# Patient Record
Sex: MALE | Race: WHITE | Employment: STUDENT | URBAN - METROPOLITAN AREA
[De-identification: names, ages, dates, MRNs, and addresses within clinical notes are randomized per-mention and may not be internally consistent; named-entity substitution may affect disease eponyms.]

---

## 2019-02-13 ENCOUNTER — HOSPITAL ENCOUNTER (EMERGENCY)
Facility: CLINIC | Age: 24
Discharge: HOME | End: 2019-02-13
Attending: EMERGENCY MEDICINE | Admitting: EMERGENCY MEDICINE
Payer: COMMERCIAL

## 2019-02-13 VITALS
SYSTOLIC BLOOD PRESSURE: 130 MMHG | DIASTOLIC BLOOD PRESSURE: 84 MMHG | HEART RATE: 63 BPM | RESPIRATION RATE: 18 BRPM | OXYGEN SATURATION: 100 % | WEIGHT: 155 LBS | TEMPERATURE: 98.2 F

## 2019-02-13 DIAGNOSIS — K08.89 ODONTALGIA: ICD-10-CM

## 2019-02-13 DIAGNOSIS — K02.9 DENTAL DECAY: ICD-10-CM

## 2019-02-13 PROCEDURE — 25000132 ZZH RX MED GY IP 250 OP 250 PS 637: Performed by: EMERGENCY MEDICINE

## 2019-02-13 PROCEDURE — 99283 EMERGENCY DEPT VISIT LOW MDM: CPT | Mod: 25

## 2019-02-13 PROCEDURE — 64400 NJX AA&/STRD TRIGEMINAL NRV: CPT

## 2019-02-13 RX ORDER — IBUPROFEN 800 MG/1
800 TABLET, FILM COATED ORAL EVERY 8 HOURS PRN
Qty: 24 TABLET | Refills: 0 | Status: SHIPPED | OUTPATIENT
Start: 2019-02-13 | End: 2019-02-20

## 2019-02-13 RX ORDER — ACETAMINOPHEN 500 MG
500-1000 TABLET ORAL EVERY 8 HOURS PRN
Qty: 1 TABLET | Refills: 0 | Status: SHIPPED | OUTPATIENT
Start: 2019-02-13 | End: 2019-02-23

## 2019-02-13 RX ORDER — IBUPROFEN 800 MG/1
800 TABLET, FILM COATED ORAL ONCE
Status: COMPLETED | OUTPATIENT
Start: 2019-02-13 | End: 2019-02-13

## 2019-02-13 RX ORDER — ACETAMINOPHEN 500 MG
1000 TABLET ORAL ONCE
Status: COMPLETED | OUTPATIENT
Start: 2019-02-13 | End: 2019-02-13

## 2019-02-13 RX ORDER — BUPIVACAINE HYDROCHLORIDE AND EPINEPHRINE 5; 5 MG/ML; UG/ML
INJECTION, SOLUTION PERINEURAL
Status: DISCONTINUED
Start: 2019-02-13 | End: 2019-02-13 | Stop reason: HOSPADM

## 2019-02-13 RX ADMIN — IBUPROFEN 800 MG: 800 TABLET, FILM COATED ORAL at 07:14

## 2019-02-13 RX ADMIN — ACETAMINOPHEN 1000 MG: 500 TABLET, FILM COATED ORAL at 07:14

## 2019-02-13 SDOH — HEALTH STABILITY: MENTAL HEALTH: HOW OFTEN DO YOU HAVE A DRINK CONTAINING ALCOHOL?: NEVER

## 2019-02-13 ASSESSMENT — ENCOUNTER SYMPTOMS
FACIAL SWELLING: 0
FEVER: 0
TROUBLE SWALLOWING: 0

## 2019-02-13 NOTE — DISCHARGE INSTRUCTIONS
Discharge Instructions  Dental Pain    You have been seen today for a toothache. Your pain may be caused by an exposed nerve, an infection (pulpitis), a root abscess (pocket of pus), or other problems. You will need to see a dentist for a solution to your tooth problem. Emergency Department care is only to help control your problem until you can see a dentist; we cannot provide complete dental care.  Today, we did not find any sign that your toothache was caused by any dangerous or life-threatening condition, but sometimes symptoms develop over time and cannot be found during an emergency visit, so it is very important that you follow up with your dentist.      Generally, every Emergency Department visit should have a follow-up clinic visit with either a primary or a specialty clinic/provider. Please follow-up as instructed by your emergency provider today.    Return to the Emergency Department if:  You develop a new fever over 100.4 F.  You cannot open your mouth normally, cannot move your tongue well, or cannot swallow.  You have new or increased swelling of your face or neck.  You develop drainage of pus or foul smelling material from around your tooth.  What can I do to help myself?  Take any antibiotic the provider may have prescribed for you today.  Avoid very hot or very cold foods as both can cause pain.  Make an appointment to see a dentist as soon as possible. Dentists are generally not ?on-staff? at hospitals so we cannot ?refer? to you to dentist but we may be able to provide a list of dental clinics to help you.  If you were given a prescription for medicine here today, be sure to read all of the information (including the package insert) that comes with your prescription.  This will include important information about the medicine, its side effects, and any warnings that you need to know about.  The pharmacist who fills the prescription can provide more information and answer questions you may have  about the medicine.  If you have questions or concerns that the pharmacist cannot address, please call or return to the Emergency Department.   Remember that you can always come back to the Emergency Department if you are not able to see your regular provider in the amount of time listed above, if you get any new symptoms, or if there is anything that worries you.      Discharge Instructions  Dental Pain    You have been seen today for a toothache. Your pain may be caused by an exposed nerve, an infection (pulpitis), a root abscess, or other problems. You will need to see a dentist for a solution to your tooth problem. Emergency Department care is only to help control your problem until you can see a dentist.  Today, we did not find any sign that your toothache was caused by a serious condition, but sometimes symptoms develop over time and cannot be found during an emergency visit, so it is very important that you follow up with your dentist.      Return to the Emergency Department if:  You develop a fever over 101 degrees Fahrenheit  You can?t open your mouth normally, can?t move your tongue well, or can?t swallow  You have new or increased swelling of your face or neck.  You develop drainage of pus or foul smelling material from around your tooth.  What can I do to help myself?  Take any antibiotic the doctor may have prescribed for you today.  Avoid very hot or very cold foods as both can cause pain.  Make an appointment to see a dentist as soon as possible. If you wish, we can provide you with a list of low-cost dental clinics.       Remember that you can always come back to the Emergency Department if you are not able to see your regular doctor in the amount of time listed above, if you get any new symptoms, or if there is anything that worries you.        Dental Resources  Name/Address/Phone Eligibility Hours Fee   [facility]  [address], [address]  [place], [place] [address] [phone]  Anyone Call for appointment [facility]  Medical Assistance  Private Insurance   [facility]  [address] [place] [address]  [phone] Anyone Call for appointment    Jordin refers to low-cost dental clinics for non-preventive care    Zimbabwean Interpreters available Prices start at   Adults        Cleaning $36-$160        X-Ray $20-40  Children        Cleaning $15        X-ray $10-20        Fluoride $10  Accepts cash, check or credit;  Does not take insurance or MA.   [facility]  [address] [place], [place]  [phone] Anyone Afternoons and evenings    September-May    Answers phones after 10 AM $30.00 per visit   ($15.00 per visit if 62 or older)   Preventive care.  Restoration care; sliding fee; MA   [facility]  [address] [place] [address]  [phone] Children birth to age 18 and pregnant women    [facility] Residents without insurance will be asked to apply for Assured Care. M TH F 8:30 am - 5 pm  T W 8:30 am - 7 pm    30 locations metro wide    Call for appointment and to confirm hours. Sliding Fee  [facility]  Medical Assistance  Assured Access  Private Insurance    8 Languages Spoken   [facility] [address] [address]  [phone] Anyone Call for appointment Sliding Fee  Accept insurance, [place],   [place] and self-pay.  Call if no insurance.    All services provided.  Staff fluent in Hmong, Laotian, Icelandic, Slovak, Icelandic, Zimbabwean, and Farsi.   [facility]  [address] [place] [address]  [phone] Children  Adults in a walk in basis Mon - Fri. 8 - 5 pm       (closed 1-2 pm)  General Dentists & Hygienists  Private Dentists  Dentures Fees based on family size and income ranging from 40% - 100% payment by patient.   [facility]  [address] [place]  [address]    [phone]  Anyone Mon - Fri 7:30 am - 5:00 pm  By Appt.    Tues & Wed @ 3:00 call for urgent care Appt for next day service Sliding fee:  MA; Insurance   [facility]  [address] [place]  [address]  [phone] Anyone Call for an appointment.  Days open vary every semester. Adult cleaning $25  Child cleaning $15  X-Rays $10-$15  Whitening services available  $75, includes cleaning  Seniors 50% off   [facility]  [address] [place], [place]  [phone] Anyone M-F 8 am - 5 pm Most insurances accepted.  MA and Sliding Scale.   [facility]  [address] [place] [address]  [phone] Anyone without insurance Call to make appt   M-F 9:00 am - 5 pm   (Closed noon hour 12-1)    6 pm- 8 pm Evening hours also available for care Sliding fee based on income and size of household.   [facility]  [address] [place] [address]  [address]  [phone] press option 1    For the [facility] press option 4 Anyone              Anyone Monday  4 - 6:30 pm  Tuesday 12:30 - 3 & 4-6:30  Thursday 8:30 - 11 am & 12-2:30 pm  September through May only    All year around on Thursdays from 5-9 pm (only time a dentist is in.) Cleanings & X-Rays Only  Cleanings:  Adults $30                   Kids $20  X-Rays:  Adults $34                Kids $10    MA and Sliding fee   [facility]  [address] [place] [address]    [phone] Anyone    ([employer] interpreters available) M-F 8:00 am - 5:00 pm       By appointment only  (same day appointments available) Sliding fee ($40+ may be due at appointment, remainder billed); MA; Insurance   [facility]  [address] [place], [place] [phone]   Anyone    ([employer] interpreters available) M-Th 8:00 am - 8:00 pm  F 8:00 am - 5:00 pm    By appointment only  (same day appointments available)  Sliding fee ($40+ may be due at appointment, remainder billed); MA; Insurance   [facility]  [address] [place], [place]  [phone] Anyone    Must live within [facility] to qualify for sliding fee services Mon, Tues, Thurs, Fri  8:30 am - 5:00 pm  Wed. 8:30 am - 7:00 pm  All other services by appt. only Sliding Fee; MA   Offer payment plans    Have financial workers that will assist with [place] and will use sliding fee for those who do not qualify.      [facility]  [address] [place] [phone] Anyone without insurance     Hours and day of week vary  (Call ahead for hours)    Walk-in only Free Services    Cleanings; Fillings; Extractions   [facility]  [address] [place] [address]  [phone] Anyone Call for an appointment Accept patients with [place] and Medical Assistance.  10% discount if bill is paid in full on day of service.  No sliding fee scale.     [facility]  [address] [place], [place] [phone] Anyone M-F  8 am-5 pm  Call for appt.    Walk-in hours 8 am - 11am and 1 pm - 5 pm, however take scheduled appointments first    No emergency services or oral surgeries Sliding Fee available with an [place] or [place] denial letter and proof of income.    Accepts Assured Access card and MA coverage.     Name/Address/Phone Eligibility Hours Fee   [facility]  [address] [place]  [address] [phone] Anyone with emergencies only M & W clinic begins at 6 pm   Call ahead    Alternate Fridays for children by Appt only Free   [facility]  [address] [place] [address] [phone]    Emergencies (adults only):  [phone] Anyone Free walk-in screens for oral surgery    Call ahead for hours    All other services by appt. only  Accepts MA for pediatrics only    Rates are about 25% -  40% less than private dental office.    No sliding fee scale   [facility]  [facility] [place]  [address]  [phone] Anyone as long as they do not have health insurance Hours on Mondays, Tuesdays, and Thursdays Sliding fees based on monthly income    No root canals, tooth pulls or emergencies   [facility]  [address] [address]  [phone] Anyone  M - F 8:00 am - 5:00 pm  Wed 8:00 am - 8 pm Sliding fees; MA; Insurance   [facility]  [address] [place] [address]  [address]  [phone] Anyone age 55+ M - F 8:00 am - 4:30 pm    Appt. only Set slightly lower fees;  MA; Insurance         Give Kids a smile day in [facility] Children Takes place in February at a few locations                          Dental Pain:      Dear Emergency Department Patient:     Here at [facility], we are always pleased to evaluate you for emergency conditions and offer a screening examination. Today, we have seen you and determined that you have dental pain and/or a dental problem.  We do not have the equipment and/or advanced training to perform definitive dental care, therefore, you need to be seen by a dentist for further care.     You may see your regular dent  ist if you have one, or we have attached a list of community dental providers, including some who provide care at a reduced fee.      Please be aware that if a narcotic medication was prescribed, it will not be refilled in the emergency department.  Accordingly, if you should have ongoing problems and/or pain, you should contact a dentist right away for definitive treatment.    Sincerely,        [facility])

## 2019-02-13 NOTE — ED AVS SNAPSHOT
[facility] Emergency Department  [address]  [facility] [address]  Phone:  [phone]  Fax:  [phone]                                    Edgar Huynh   MRN: [id number]    Department:  [facility] Emergency Department   Date of Visit:  2/13/2019           After Visit Summary Signature Page    I have received my discharge instructions, and my questions have been answered. I have discussed any challenges I see with this plan with the nurse or doctor.    ..........................................................................................................................................  Patient/Patient Representative Signature      ..........................................................................................................................................  Patient Representative Print Name and Relationship to Patient    ..................................................               ................................................  Date                                   Time    ..........................................................................................................................................  Reviewed by Signature/Title    ...................................................              ..............................................  Date                                               Time          22EPIC Rev 08/18

## 2019-02-13 NOTE — ED PROVIDER NOTES
History     Chief Complaint:  Dental Pain    HPI   Edgar Huynh is a 23 year old male who presents with right lower jaw/dental pain. The patient states that he has had issues with his teeth in the past and has several known broken/cracked teeth. Yesterday, around 11 AM he was at work on break when he began experiencing a pain in his right lower jaw. He tried Ora-gel and 1 gram of Tylenol while at work with temporary relief. Since then he has developed a throbbing pain in his right lower jaw, radiating up into his right ear and right scalp. He has been unable to sleep much due to pain and comes here this morning for evaluation and hopeful pain control. The patient has not had any fevers or facial swelling. He denies any drainage from the area and has no sore throat.    Allergies:  The patient is currently on no regular medications.     Medications:    The patient is currently on no regular medications.      Past Medical History:    The patient does not have any past pertinent medical history.     Past Surgical History:    History reviewed. No pertinent surgical history.     Family History:    History reviewed. No pertinent family history.      Social History:  Smoking Status: Current Smoker  Alcohol Use: No  Patient presents alone  Marital Status:  Single      Review of Systems   Constitutional: Negative for fever.   HENT: Positive for dental problem and ear pain. Negative for facial swelling and trouble swallowing.    All other systems reviewed and are negative.      Physical Exam     Patient Vitals for the past 24 hrs:   BP Temp Temp src Pulse Resp SpO2 Weight   02/13/19 0636 130/84 98.2  F (36.8  C) Temporal 63 18 100 % 70.3 kg (155 lb)        Physical Exam  Nursing note and vitals reviewed.    Constitutional: Pleasant and well groomed.          HENT: Right lower first molar has severe decay. Floor of the mouth is soft. No facial swelling, no adjacent swelling.   Eyes: Conjunctivae normal are normal. No  scleral icterus.   Neck: No cervical adenopathy  Cardiovascular: Peripheral pulse with normal rate, regular rhythm. Intact distal pulses.Regular rate and rhythm to auscultation.  No murmur  Pulmonary/Chest: Effort normal    Neurological:Alert. Coordination normal.   Skin: Skin is warm and dry.   Psychiatric: Normal mood and affect.     Emergency Department Course     Procedures:  Procedure Note:     Periapical Dental Block    Benzocaine Gel was applied adjacent to the affected tooth. A dental syringe with 27 gauge need and 1.8 cc of 0.5% Bupivacaine with epinephrine was utilized.  A inferior alveolar nerve block was performed. he patient tolerated the procedure without complication. He noted improvement in his symptoms.     Interventions:  0714 - Ibuprofen 800 mg PO  0714 - Tylenol 1g PO     Emergency Department Course:  Past medical records, nursing notes, and vitals reviewed.  0704: I performed an exam of the patient and obtained history, as documented above.     Dental block performed as documented above.    0730: I rechecked the patient. Findings and plan explained to the Patient. Patient discharged home with instructions regarding supportive care, medications, and reasons to return. The importance of close follow-up was reviewed.       Impression & Plan      Medical Decision Making:  The patient presents with a tooth ache.  There is no swelling or fluctuance to suggest an abscess that would benefit from  incision and drainage.   The differential diagnosis includes, but is not limited to; cracked tooth syndrome, pulpitis, periapical abscess. There is no evidence of buccinator/canine space infections, significant facial swelling, or signs of Joshua's angina. I have instructed the patient to return to the ED with fever, facial swelling, other new or worse symptoms.  I have stressed the importance of  follow up with a dentist/endodontist as soon as possible for definitive management of their dental problem.  The  patient was provided with community dental resources.     Diagnosis:    ICD-10-CM    1. Odontalgia K08.89    2. Dental decay K02.9        Discharge Medications:     acetaminophen 500 MG tablet  Commonly known as:  TYLENOL  500-1,000 mg, Oral, EVERY 8 HOURS PRN, DO NOT FILL!  For Dosing Only       George Hernandez  2/13/2019   [facility] EMERGENCY DEPARTMENT  I, George Hernandez, am serving as a scribe at 7:04 AM on 2/13/2019 to document services personally performed by Shamika Hancock MD based on my observations and the provider's statements to me.       Shamika Hancock MD  02/13/19 1129

## 2019-02-13 NOTE — ED TRIAGE NOTES
23-year-old male presents to the ER with complaints of dental pain. States a tooth on the bottom right broke.

## 2019-03-29 ENCOUNTER — OFFICE VISIT - HEALTHEAST (OUTPATIENT)
Dept: FAMILY MEDICINE | Facility: CLINIC | Age: 24
End: 2019-03-29

## 2019-03-29 DIAGNOSIS — S39.012A STRAIN OF LUMBAR REGION, INITIAL ENCOUNTER: ICD-10-CM

## 2019-03-29 ASSESSMENT — MIFFLIN-ST. JEOR: SCORE: 1754.32

## 2019-04-08 ENCOUNTER — OFFICE VISIT - HEALTHEAST (OUTPATIENT)
Dept: FAMILY MEDICINE | Facility: CLINIC | Age: 24
End: 2019-04-08

## 2019-04-08 DIAGNOSIS — S39.012D STRAIN OF LUMBAR REGION, SUBSEQUENT ENCOUNTER: ICD-10-CM

## 2019-08-07 ENCOUNTER — APPOINTMENT (OUTPATIENT)
Dept: GENERAL RADIOLOGY | Facility: CLINIC | Age: 24
End: 2019-08-07
Attending: EMERGENCY MEDICINE
Payer: COMMERCIAL

## 2019-08-07 PROCEDURE — 99284 EMERGENCY DEPT VISIT MOD MDM: CPT | Mod: Z6 | Performed by: EMERGENCY MEDICINE

## 2019-08-07 PROCEDURE — 99284 EMERGENCY DEPT VISIT MOD MDM: CPT | Performed by: EMERGENCY MEDICINE

## 2019-08-07 PROCEDURE — 71046 X-RAY EXAM CHEST 2 VIEWS: CPT

## 2019-08-07 PROCEDURE — 73030 X-RAY EXAM OF SHOULDER: CPT | Mod: LT

## 2019-08-07 ASSESSMENT — MIFFLIN-ST. JEOR: SCORE: 1702.07

## 2019-08-08 ENCOUNTER — HOSPITAL ENCOUNTER (EMERGENCY)
Facility: CLINIC | Age: 24
Discharge: HOME | End: 2019-08-08
Attending: EMERGENCY MEDICINE | Admitting: EMERGENCY MEDICINE
Payer: COMMERCIAL

## 2019-08-08 VITALS
HEIGHT: 71 IN | DIASTOLIC BLOOD PRESSURE: 43 MMHG | TEMPERATURE: 98.7 F | SYSTOLIC BLOOD PRESSURE: 105 MMHG | RESPIRATION RATE: 16 BRPM | WEIGHT: 149.9 LBS | HEART RATE: 71 BPM | OXYGEN SATURATION: 97 % | BODY MASS INDEX: 20.98 KG/M2

## 2019-08-08 DIAGNOSIS — S20.212A CONTUSION OF LEFT CHEST WALL, INITIAL ENCOUNTER: ICD-10-CM

## 2019-08-08 PROCEDURE — 25000132 ZZH RX MED GY IP 250 OP 250 PS 637: Performed by: EMERGENCY MEDICINE

## 2019-08-08 RX ORDER — HYDROCODONE BITARTRATE AND ACETAMINOPHEN 5; 325 MG/1; MG/1
1 TABLET ORAL ONCE
Status: COMPLETED | OUTPATIENT
Start: 2019-08-08 | End: 2019-08-08

## 2019-08-08 RX ADMIN — HYDROCODONE BITARTRATE AND ACETAMINOPHEN 1 TABLET: 5; 325 TABLET ORAL at 01:10

## 2019-08-08 ASSESSMENT — ENCOUNTER SYMPTOMS
NECK PAIN: 1
NUMBNESS: 0
SHORTNESS OF BREATH: 0
NAUSEA: 0
MYALGIAS: 1
VOMITING: 0
HEADACHES: 0
WEAKNESS: 0
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
## 2019-08-08 NOTE — DISCHARGE INSTRUCTIONS
Please make an appointment to [facility] with [facility] (phone: [phone]) as needed.      Use ice packs as needed on area of discomfort.  You may use acetaminophen 1000 mg every 6 hours or ibuprofen 600 mg every 6 hours as needed for pain.      If you have any worsening symptoms, return to the emergency department for re-evaluation.
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
## 2019-08-08 NOTE — ED AVS SNAPSHOT
[facility], [place], Emergency Department  [address] [place] [id number]  Phone:  [phone]                                    Edgar Huynh   MRN: [id number]    Department:  [facility], Emergency Department   Date of Visit:  8/7/2019           After Visit Summary Signature Page    I have received my discharge instructions, and my questions have been answered. I have discussed any challenges I see with this plan with the nurse or doctor.    ..........................................................................................................................................  Patient/Patient Representative Signature      ..........................................................................................................................................  Patient Representative Print Name and Relationship to Patient    ..................................................               ................................................  Date                                   Time    ..........................................................................................................................................  Reviewed by Signature/Title    ...................................................              ..............................................  Date                                               Time          22EPIC Rev 08/18

## 2019-11-12 ENCOUNTER — OFFICE VISIT (OUTPATIENT)
Dept: URGENT CARE | Facility: URGENT CARE | Age: 24
End: 2019-11-12
Payer: COMMERCIAL

## 2019-11-12 VITALS
DIASTOLIC BLOOD PRESSURE: 80 MMHG | SYSTOLIC BLOOD PRESSURE: 112 MMHG | OXYGEN SATURATION: 97 % | TEMPERATURE: 98 F | HEART RATE: 96 BPM

## 2019-11-12 DIAGNOSIS — J06.9 VIRAL URI WITH COUGH: ICD-10-CM

## 2019-11-12 DIAGNOSIS — H66.91 RIGHT ACUTE OTITIS MEDIA: Primary | ICD-10-CM

## 2019-11-12 PROCEDURE — 99203 OFFICE O/P NEW LOW 30 MIN: CPT | Performed by: FAMILY MEDICINE

## 2019-11-12 RX ORDER — AMOXICILLIN 875 MG
875 TABLET ORAL 2 TIMES DAILY
Qty: 20 TABLET | Refills: 0 | Status: SHIPPED | OUTPATIENT
Start: 2019-11-12 | End: 2019-11-22

## 2019-11-12 RX ORDER — BENZONATATE 100 MG/1
100 CAPSULE ORAL 3 TIMES DAILY PRN
Qty: 21 CAPSULE | Refills: 1 | Status: SHIPPED | OUTPATIENT
Start: 2019-11-12

## 2019-11-13 NOTE — PROGRESS NOTES
Subjective:   Edgar Huynh is a 23 year old male who presents for   Chief Complaint   Patient presents with     Urgent Care     URI     Having cough, colds, chills, myalgia, fatigue, sore throat. Sx x2.5 weeks.     2.5 weeks of symptoms there was no period of feeling better - says the cough has been ongoing  Sleep has been not the best, waking up with sweats and coughing fits. Having some back discomfort from the coughing. No recorded fevers. Episode of vomiting 2 nights ago after a coughing fit. Appetite is better now. No hx of sinus infections that are recurrent. Some ear discomfort and slight pressure around his nose  Meds attempted: night time cold      There are no active problems to display for this patient.      Current Outpatient Medications   Medication     amoxicillin (AMOXIL) 875 MG tablet     benzonatate (TESSALON) 100 MG capsule     oxyCODONE-acetaminophen (PERCOCET) 5-325 MG per tablet     No current facility-administered medications for this visit.      ROS:  As above per HPI    Objective:   /80 (BP Location: Right arm, Patient Position: Chair, Cuff Size: Adult Regular)   Pulse 96   Temp 98  F (36.7  C) (Tympanic)   SpO2 97% , There is no height or weight on file to calculate BMI.  Gen:  NAD, well-nourished, sitting in chair comfortably  HEENT: EOMI, sclera anicteric, Head normocephalic, ; nares patent; moist mucous membranes,normal left TM, congested right TM with opaque fluid and bulging, no nasal polyps, normal tonsils without enlargement  Neck: trachea midline, no thyromegaly  CV:  Hemodynamically stable, RRR  Pulm:  no increased work of breathing , CTAB, no wheezes/rales/rhonchi   ABD: soft, non-distended  Extrem: no cyanosis, edema or clubbing  Skin: no obvious rashes or abnormalities  Psych: Euthymic, linear thoughts, normal rate of speech    No results found for any visits on 11/12/19.    Assessment & Plan:   Edgar Huynh, 23 year old male who presents with:  Right acute  otitis media  Twice daily amoxicillin for 10 days prescribed  - amoxicillin (AMOXIL) 875 MG tablet  Dispense: 20 tablet; Refill: 0    Viral URI with cough  For cough recommended tessalon, robitussin, honey. No evidence of pneumonia with his normal lung exam and stable vitals. F/u if having worsening symptoms.   - benzonatate (TESSALON) 100 MG capsule  Dispense: 21 capsule; Refill: 1      Jesus Manuel Sharp MD   [place] [facility]    The use of Dragon/[employer] dictation services may have been used to construct the content in this note; any grammatical or spelling errors are non-intentional. Please contact the author of this note directly if you are in need of any clarification.

## 2021-05-27 NOTE — PROGRESS NOTES
1. Strain of lumbar region, initial encounter        Plan: I think this is a pretty straightforward back strain.  He has no signs of sciatica or nerve involvement.  He is very stiff however and appears to be certainly in a bit of pain.  He did not like of the tramadol reaction with him.  He is not interested in anything stronger than Advil.  I encouraged him to continue to use the ibuprofen but to increase the strength 800 mg 3 times daily as needed.  He also can use the cyclobenzaprine for muscle relaxation and I encouraged him to do that 3 times a day as he is tolerates it.  I encouraged him to use heat on the affected areas as well as taught him some stretches that he can do for this week.    I kept him off work for a week, I like to see him again in a week from today April 5, and reevaluate him and make some recommendations from there.  We could consider some physical therapy at that time if he is loosening up a bit.     was checked and is appropriate with only the tramadol that he received in the ER on the list as well as just a few hydrocodone a long time ago from what appears to be a dentist.    Subjective: 23-year-old male new patient to our clinic who is here today for a follow-up.  He had a work injury that occurred 2 days ago on March 27.  He is a form better.  He works a lot lifting all day long and he was bending over picking up something off the floor which was heavy and he heard a pop in the middle of his low back.  The pain was right away and almost brought him to his knees.  He was able to get up and move around to bed and it seemed to be getting a little bit better but then it gradually got worse as it tightened up through the rest of the day.  He left work and went [facility] emergency room which was close to where they were working that day.  They did some x-rays and examined him and thought he had a back strain.  They gave him some tramadol as well as some cyclobenzaprine and ibuprofen.  He  does try to take the tramadol but he really did not like it, it made him feel a bit loopy and disconnected and did not really too much for the pain either so he stopped taking that.  He has been using the ibuprofen but only at 400 mg 3 times daily as needed, and the cyclobenzaprine does actually help him more than anything and it does help him to sleep as well.    Patient is new patient to the clinic. Please see past medical history, surgical history, social history and family history, all of which were completed in their entirety today.     Objective: Well-appearing male in no acute distress.  He takes a long time just even getting up from a seated position to standing.  He appears to be very tight and spasmic in the low back and he walks in a very stiff manner.  Chest is clear heart regular rate and rhythm.  The low back shows tenderness over the mid back down by the sacral area and just above that area also has a little bit of tenderness to the paraspinal musculature right and left.  He has a very limited range of motion to flexion almost not more than 15 degrees.  He has very little lateral flexion and rotation as well he just seems very tight with his musculature.  Negative sciatic notch tenderness negative straight leg raise.  Good strength and sensation and reflexes in all 4 extremities.

## 2021-05-27 NOTE — PROGRESS NOTES
1. Strain of lumbar region, subsequent encounter          Plan: I did  write him another note for work.  He can go back to work on April 10, but I will have him do some limitations on the way to get a lift for a couple more days, and that I would think by April 15, he would be able to go back to work without any restrictions.  He will try that, and he will let us know if that seems to be working out well for him or if he needs different note written.    Subjective: 23-year-old male is here today for recheck of his back pain.  Please see my previous note discussing his lumbar strain.  He is gotten better and feels that he is about 75% better.  He would like to go back to work with a weight restriction this week and then hopefully go back to work full next week.  He is using the ibuprofen still and using some heat and he feels that the stretching exercises that I showed him a bit very beneficial.    Objective: Well-appearing male in no acute distress.  Vital signs as noted.  Chest clear.  Heart regular rate and rhythm.  He has a bit better range of motion to flexion and lateral flexion today as well as to rotation.

## 2021-06-02 VITALS — BODY MASS INDEX: 21.81 KG/M2 | WEIGHT: 161 LBS | HEIGHT: 72 IN

## 2021-06-02 VITALS — BODY MASS INDEX: 21.13 KG/M2 | WEIGHT: 154.7 LBS

## 2021-06-19 NOTE — LETTER
Letter by Eric Tellez MD at      Author: Eric Tellez MD Service: -- Author Type: --    Filed:  Encounter Date: 3/29/2019 Status: (Other)         March 29, 2019     Patient: Edgar Huynh   YOB: 1995   Date of Visit: 3/29/2019       To Whom It May Concern:    It is my medical opinion that Edgar Huynh should remain out of work until 4/5/2019, when he will return for re-evaluation..    If you have any questions or concerns, please don't hesitate to call.    Sincerely,        Electronically signed by Eric Tellez MD

## 2021-06-19 NOTE — LETTER
Letter by Eric Tellez MD at      Author: Eric Tellez MD Service: -- Author Type: --    Filed:  Encounter Date: 4/8/2019 Status: (Other)         April 8, 2019     Patient: Edgar Huynh   YOB: 1995   Date of Visit: 4/8/2019       To Whom It May Concern:    It is my medical opinion that Edgar Huynh can return to work 4/10/2019, but cannot lift more than 15 pounds through 4/12/2019.  He can then return 4/15/2019 without restrictions..    If you have any questions or concerns, please don't hesitate to call.    Sincerely,        Electronically signed by Eric Tellez MD